# Patient Record
(demographics unavailable — no encounter records)

---

## 2025-04-11 NOTE — ASSESSMENT
[FreeTextEntry1] : Romel Sapp is 9 years old male with no significant past medical history. From pulmonary and sleep perspective, he has history of exercise induced right sided chest pain and shortness of breath.  From pulmonary perspective, the exact etiology of his right sided chest pain is unclear. It could be due to exercise induced shortness of breath. VCD seems less likely. His spirometry is normal, and he did not have a significant bronchodilator response.   From sleep perspective, other than snoring, he sleeps well throughout the night. There is no history of restless sleep, insomnia or parasomnia.  Plan: I had a long discussion with the father. At this time, I would like to start him on pre activity albuterol to see the response. I would also like to have a baseline chest x-tray and plan the follow up accordingly. The father understands and agrees with the plan. 1. I have ordered a chest x-ray for your child. Please take him to the nearby [facility] facility.  2. Once the chest x-ray is done, I will review and call you with the results.  3. Start 2 puffs of albuterol 15-20 minutes before physical activity which will help to improve exercise compliance and will decrease the need for post activity albuterol. 4. Always use spacer with inhaler. MDI/spacer teaching was provided in the clinic.  5. I will see him as needed.

## 2025-04-11 NOTE — HISTORY OF PRESENT ILLNESS
[FreeTextEntry1] : Romel has history of exercise induced right sided chest pain and shortness of breath for last 6 months. It happens 5 minutes after he starts running. It is always on the right side and is sharp in nature. The pain improved in 30 minutes. There is no history of cough, wheezing or noisy breathing. There is no history of throat closing or stridor. He was recently seen by the outside ENT and was found to have mild adenoidal hypertrophy. He was started on Flonase once a day. He is going to see cardiologist on next Friday.   There is no history of feeding difficulties including cough or choking with feeding. There is no history of noisy breathing. There is no history of recurrent pneumonias.  ALLERGY SYMPTOMS: There is no history of nasal and ocular allergy symptoms. There is no history of itchy or watery eyes, itchy or watery nose, and chronic nasal congestion. The patient does not use any antiallergic and/or nasal steroids.  SLEEP SYMPTOMS: There is history of loud snoring, no history of respiratory pauses during sleep, restless sleep, sleep onset and maintenance insomnia. The patient did not have a sleep study.  OTHER SYMPTOMS: The patient is feeding, growing and developing well. There is no history of reflux/GERD. There is no history of, FTT, developmental delay, seizure or neurological disorder.  NEONATOLOGY HISTORY: The patient was born at 40 weeks via . There were no complications during pregnancy and after delivery.  PAST MEDICAL & SURGICAL HISTROY: There is no history of adenotonsillectomy or any other surgery.  FAMILY HISTORY: There is no family history of asthma, eczema and severe allergies. There is no family history of VANESSA, RLS and narcolepsy.  SOCIAL HISTORY: The patient lives with the parents and 1 sibling. There is no smoke or pet exposure at home.

## 2025-04-11 NOTE — PHYSICAL EXAM
[TextEntry] : General: Well-developed, well-nourished, no acute distress, well-appearing, active and playful. Eyes: Extra-ocular movements intact, conjunctiva clear. Head: normocephalic, atraumatic. Ear: Right ear: Normal external ear, non-erythematous tympanic membrane Left ear: Normal external ear, non-erythematous tympanic membrane Nose: Right nose: Nasal cavity is patent, normal nasal mucosa, turbinates are not enlarged Left nose: Nasal cavity is patent, Normal nasal mucosa, turbinates are not enlarged Throat: Oral mucous membranes moist and pink, no oral lesions, normal dentition and gingiva, tonsils 2+ BL, Mallampati class II. Neck: Supple, trachea midline, no masses. Cardiovascular: Regular rate and rhythm, no murmurs appreciated; capillary refill < 2 second; 2+ radial pulses bilaterally; no edema. Respiratory: No deformities of the chest, symmetric chest rise, breathing non-labored, no retractions, no nasal flaring, no tracheal tug, lungs clear to auscultation BL, good aeration BL through all lung fields, no crackles, no wheezes, no upper airway transmitted sounds. GI: Abdomen soft, nontender, nondistended, normal bowel sounds, no masses, no organomegaly. Lymph: No cervical lymphadenopathy appreciated. Musculoskeletal: Normal muscle tone and strength. Extremities: Warm, well-perfused, no digital clubbing or cyanosis. Skin: Warm, dry, no rashes. Neurology: Awake, alert, and interactive, normal affect, developmentally appropriate.

## 2025-04-11 NOTE — REASON FOR VISIT
[TextEntry] : Romel Sapp is 9 years old male with no significant past medical history. From pulmonary and sleep perspective, he has history of exercise induced right sided chest pain and shortness of breath. The patient is here for an initial evaluation. The history was obtained from the patient and mother.

## 2025-04-11 NOTE — IMPRESSION
[TextEntry] : Interpretation: Spirometry and flow volume loop are normal. There was no significant bronchodilator response. Absence of response should not preclude use of bronchodilator if clinically indicated. Lung volumes, airway resistance and diffusion capacity were not checked. There is no prior study to compare.